# Patient Record
Sex: FEMALE | URBAN - METROPOLITAN AREA
[De-identification: names, ages, dates, MRNs, and addresses within clinical notes are randomized per-mention and may not be internally consistent; named-entity substitution may affect disease eponyms.]

---

## 2020-06-15 ENCOUNTER — APPOINTMENT (OUTPATIENT)
Dept: URBAN - METROPOLITAN AREA SURGERY 9 | Age: 80
Setting detail: DERMATOLOGY
End: 2020-06-15

## 2020-06-15 VITALS — DIASTOLIC BLOOD PRESSURE: 80 MMHG | RESPIRATION RATE: 10 BRPM | SYSTOLIC BLOOD PRESSURE: 130 MMHG | HEART RATE: 72 BPM

## 2020-06-15 PROBLEM — C44.319 BASAL CELL CARCINOMA OF SKIN OF OTHER PARTS OF FACE: Status: ACTIVE | Noted: 2020-06-15

## 2020-06-15 PROCEDURE — 17311 MOHS 1 STAGE H/N/HF/G: CPT

## 2020-06-15 PROCEDURE — OTHER MOHS SURGERY: OTHER

## 2020-06-15 PROCEDURE — OTHER CONSULTATION FOR MOHS SURGERY: OTHER

## 2020-06-15 PROCEDURE — 17312 MOHS ADDL STAGE: CPT

## 2020-06-15 PROCEDURE — 12052 INTMD RPR FACE/MM 2.6-5.0 CM: CPT

## 2020-06-15 PROCEDURE — OTHER RETURN TO REFERRING PROVIDER: OTHER

## 2020-06-15 NOTE — PROCEDURE: CONSULTATION FOR MOHS SURGERY
Detail Level: Detailed
Body Location Override (Optional - Billing Will Still Be Based On Selected Body Map Location If Applicable): left cheek
X Size Of Lesion In Cm (Optional): 0
Incorporate Mauc In Note: Yes

## 2020-06-15 NOTE — HPI: MOHS SURGERY CONSULTATION
Has The Cancer Been Biopsied Before?: has been previously biopsied
Who Is Your Referring Provider?: Dr. Bangura

## 2022-05-19 ENCOUNTER — APPOINTMENT (OUTPATIENT)
Dept: URBAN - METROPOLITAN AREA CLINIC 188 | Age: 82
Setting detail: DERMATOLOGY
End: 2022-05-19

## 2022-11-09 NOTE — PROCEDURE: MOHS SURGERY
syncope Partial Purse String (Simple) Text: Given the location of the defect and the characteristics of the surrounding skin a simple purse string closure was deemed most appropriate.  Undermining was performed circumfirentially around the surgical defect.  A purse string suture was then placed and tightened. Wound tension only allowed a partial closure of the circular defect.

## 2023-05-22 ENCOUNTER — APPOINTMENT (OUTPATIENT)
Dept: URBAN - METROPOLITAN AREA CLINIC 188 | Age: 83
Setting detail: DERMATOLOGY
End: 2023-05-22